# Patient Record
Sex: FEMALE | Race: WHITE | NOT HISPANIC OR LATINO | URBAN - METROPOLITAN AREA
[De-identification: names, ages, dates, MRNs, and addresses within clinical notes are randomized per-mention and may not be internally consistent; named-entity substitution may affect disease eponyms.]

---

## 2017-08-22 ENCOUNTER — OFFICE (OUTPATIENT)
Dept: URBAN - METROPOLITAN AREA CLINIC 14 | Facility: CLINIC | Age: 67
End: 2017-08-22

## 2017-08-22 VITALS
HEART RATE: 55 BPM | HEIGHT: 64 IN | DIASTOLIC BLOOD PRESSURE: 80 MMHG | RESPIRATION RATE: 16 BRPM | WEIGHT: 147 LBS | SYSTOLIC BLOOD PRESSURE: 147 MMHG

## 2017-08-22 DIAGNOSIS — K21.9 GASTRO-ESOPHAGEAL REFLUX DISEASE WITHOUT ESOPHAGITIS: ICD-10-CM

## 2017-08-22 PROCEDURE — 99212 OFFICE O/P EST SF 10 MIN: CPT | Performed by: INTERNAL MEDICINE

## 2017-08-22 RX ORDER — OMEPRAZOLE 20 MG/1
CAPSULE, DELAYED RELEASE ORAL
Qty: 0 | Refills: 0 | Status: ACTIVE

## 2017-08-22 RX ORDER — RANITIDINE 300 MG/1
TABLET ORAL
Qty: 90 | Refills: 4 | Status: COMPLETED
Start: 2017-08-22 | End: 2020-09-28

## 2018-09-04 ENCOUNTER — OFFICE (OUTPATIENT)
Dept: URBAN - METROPOLITAN AREA CLINIC 14 | Facility: CLINIC | Age: 68
End: 2018-09-04

## 2018-09-04 VITALS
DIASTOLIC BLOOD PRESSURE: 73 MMHG | HEIGHT: 64 IN | SYSTOLIC BLOOD PRESSURE: 144 MMHG | HEART RATE: 57 BPM | WEIGHT: 146 LBS

## 2018-09-04 DIAGNOSIS — R10.13 EPIGASTRIC PAIN: ICD-10-CM

## 2018-09-04 DIAGNOSIS — K21.9 GASTRO-ESOPHAGEAL REFLUX DISEASE WITHOUT ESOPHAGITIS: ICD-10-CM

## 2018-09-04 PROCEDURE — 99212 OFFICE O/P EST SF 10 MIN: CPT | Performed by: INTERNAL MEDICINE

## 2018-09-04 RX ORDER — RANITIDINE 300 MG/1
TABLET ORAL
Qty: 90 | Refills: 4 | Status: COMPLETED
Start: 2017-08-22 | End: 2020-09-28

## 2020-09-28 ENCOUNTER — OFFICE (OUTPATIENT)
Dept: URBAN - METROPOLITAN AREA CLINIC 19 | Facility: CLINIC | Age: 70
End: 2020-09-28

## 2020-09-28 VITALS
SYSTOLIC BLOOD PRESSURE: 171 MMHG | DIASTOLIC BLOOD PRESSURE: 78 MMHG | HEART RATE: 56 BPM | WEIGHT: 148 LBS | OXYGEN SATURATION: 98 % | HEIGHT: 64 IN

## 2020-09-28 DIAGNOSIS — R11.2 NAUSEA WITH VOMITING, UNSPECIFIED: ICD-10-CM

## 2020-09-28 DIAGNOSIS — K21.9 GASTRO-ESOPHAGEAL REFLUX DISEASE WITHOUT ESOPHAGITIS: ICD-10-CM

## 2020-09-28 DIAGNOSIS — R10.13 EPIGASTRIC PAIN: ICD-10-CM

## 2020-09-28 DIAGNOSIS — R10.11 RIGHT UPPER QUADRANT PAIN: ICD-10-CM

## 2020-09-28 DIAGNOSIS — K80.50 CALCULUS OF BILE DUCT WITHOUT CHOLANGITIS OR CHOLECYSTITIS W: ICD-10-CM

## 2020-09-28 LAB
AMYLASE: 40 U/L (ref 31–110)
CBC, PLATELET, NO DIFFERENTIAL: HEMATOCRIT: 44.9 % (ref 34–46.6)
CBC, PLATELET, NO DIFFERENTIAL: HEMOGLOBIN: 14.8 G/DL (ref 11.1–15.9)
CBC, PLATELET, NO DIFFERENTIAL: MCH: 30.5 PG (ref 26.6–33)
CBC, PLATELET, NO DIFFERENTIAL: MCHC: 33 G/DL (ref 31.5–35.7)
CBC, PLATELET, NO DIFFERENTIAL: MCV: 92 FL (ref 79–97)
CBC, PLATELET, NO DIFFERENTIAL: PLATELETS: 444 X10E3/UL (ref 150–450)
CBC, PLATELET, NO DIFFERENTIAL: RBC: 4.86 X10E6/UL (ref 3.77–5.28)
CBC, PLATELET, NO DIFFERENTIAL: RDW: 13.4 % (ref 11.7–15.4)
CBC, PLATELET, NO DIFFERENTIAL: WBC: 6.7 X10E3/UL (ref 3.4–10.8)
COMP. METABOLIC PANEL (14): A/G RATIO: 1.7 (ref 1.2–2.2)
COMP. METABOLIC PANEL (14): ALBUMIN: 4.5 G/DL (ref 3.7–4.7)
COMP. METABOLIC PANEL (14): ALKALINE PHOSPHATASE: 90 IU/L (ref 39–117)
COMP. METABOLIC PANEL (14): ALT (SGPT): 55 IU/L — HIGH (ref 0–32)
COMP. METABOLIC PANEL (14): AST (SGOT): 33 IU/L (ref 0–40)
COMP. METABOLIC PANEL (14): BILIRUBIN, TOTAL: 0.4 MG/DL (ref 0–1.2)
COMP. METABOLIC PANEL (14): BUN/CREATININE RATIO: 20 (ref 12–28)
COMP. METABOLIC PANEL (14): BUN: 24 MG/DL (ref 8–27)
COMP. METABOLIC PANEL (14): CALCIUM: 10.5 MG/DL — HIGH (ref 8.7–10.3)
COMP. METABOLIC PANEL (14): CARBON DIOXIDE, TOTAL: 23 MMOL/L (ref 20–29)
COMP. METABOLIC PANEL (14): CHLORIDE: 103 MMOL/L (ref 96–106)
COMP. METABOLIC PANEL (14): CREATININE: 1.2 MG/DL — HIGH (ref 0.57–1)
COMP. METABOLIC PANEL (14): EGFR IF AFRICN AM: 52 ML/MIN/1.73 — LOW (ref 59–?)
COMP. METABOLIC PANEL (14): EGFR IF NONAFRICN AM: 45 ML/MIN/1.73 — LOW (ref 59–?)
COMP. METABOLIC PANEL (14): GLOBULIN, TOTAL: 2.7 G/DL (ref 1.5–4.5)
COMP. METABOLIC PANEL (14): GLUCOSE: 96 MG/DL (ref 65–99)
COMP. METABOLIC PANEL (14): POTASSIUM: 4.8 MMOL/L (ref 3.5–5.2)
COMP. METABOLIC PANEL (14): PROTEIN, TOTAL: 7.2 G/DL (ref 6–8.5)
COMP. METABOLIC PANEL (14): SODIUM: 141 MMOL/L (ref 134–144)
LIPASE: 42 U/L (ref 14–85)

## 2020-09-28 PROCEDURE — 99214 OFFICE O/P EST MOD 30 MIN: CPT

## 2020-09-28 RX ORDER — HYDROCODONE BITARTRATE AND ACETAMINOPHEN 5; 325 MG/1; MG/1
TABLET ORAL
Qty: 18 | Refills: 0 | Status: COMPLETED
Start: 2020-09-28 | End: 2020-12-28

## 2020-09-28 RX ORDER — ONDANSETRON HYDROCHLORIDE 4 MG/1
16 TABLET, FILM COATED ORAL
Qty: 24 | Refills: 2 | Status: ACTIVE
Start: 2020-09-28

## 2020-09-28 RX ORDER — FAMOTIDINE 20 MG/1
TABLET, FILM COATED ORAL
Qty: 0 | Refills: 0 | Status: ACTIVE

## 2020-11-24 ENCOUNTER — ON CAMPUS - OUTPATIENT (OUTPATIENT)
Dept: URBAN - METROPOLITAN AREA HOSPITAL 97 | Facility: HOSPITAL | Age: 70
End: 2020-11-24

## 2020-11-24 DIAGNOSIS — K80.50 CALCULUS OF BILE DUCT WITHOUT CHOLANGITIS OR CHOLECYSTITIS W: ICD-10-CM

## 2020-11-24 PROCEDURE — 43264 ERCP REMOVE DUCT CALCULI: CPT | Performed by: INTERNAL MEDICINE

## 2020-12-28 ENCOUNTER — OFFICE (OUTPATIENT)
Dept: URBAN - METROPOLITAN AREA CLINIC 19 | Facility: CLINIC | Age: 70
End: 2020-12-28

## 2020-12-28 VITALS
HEART RATE: 62 BPM | OXYGEN SATURATION: 99 % | SYSTOLIC BLOOD PRESSURE: 185 MMHG | HEIGHT: 64 IN | DIASTOLIC BLOOD PRESSURE: 81 MMHG | WEIGHT: 150 LBS

## 2020-12-28 DIAGNOSIS — K21.9 GASTRO-ESOPHAGEAL REFLUX DISEASE WITHOUT ESOPHAGITIS: ICD-10-CM

## 2020-12-28 DIAGNOSIS — R14.1 GAS PAIN: ICD-10-CM

## 2020-12-28 DIAGNOSIS — K80.50 CALCULUS OF BILE DUCT WITHOUT CHOLANGITIS OR CHOLECYSTITIS W: ICD-10-CM

## 2020-12-28 DIAGNOSIS — R94.5 ABNORMAL RESULTS OF LIVER FUNCTION STUDIES: ICD-10-CM

## 2020-12-28 LAB
HEPATIC FUNCTION PANEL (7): ALBUMIN: 4.6 G/DL (ref 3.7–4.7)
HEPATIC FUNCTION PANEL (7): ALKALINE PHOSPHATASE: 76 IU/L (ref 39–117)
HEPATIC FUNCTION PANEL (7): ALT (SGPT): 26 IU/L (ref 0–32)
HEPATIC FUNCTION PANEL (7): AST (SGOT): 25 IU/L (ref 0–40)
HEPATIC FUNCTION PANEL (7): BILIRUBIN, DIRECT: 0.16 MG/DL (ref 0–0.4)
HEPATIC FUNCTION PANEL (7): BILIRUBIN, TOTAL: 0.4 MG/DL (ref 0–1.2)
HEPATIC FUNCTION PANEL (7): PROTEIN, TOTAL: 6.8 G/DL (ref 6–8.5)

## 2020-12-28 PROCEDURE — 99214 OFFICE O/P EST MOD 30 MIN: CPT

## 2021-03-29 ENCOUNTER — OFFICE (OUTPATIENT)
Dept: URBAN - METROPOLITAN AREA CLINIC 19 | Facility: CLINIC | Age: 71
End: 2021-03-29

## 2021-03-29 VITALS
SYSTOLIC BLOOD PRESSURE: 159 MMHG | DIASTOLIC BLOOD PRESSURE: 64 MMHG | WEIGHT: 154 LBS | HEIGHT: 64 IN | HEART RATE: 62 BPM | OXYGEN SATURATION: 98 %

## 2021-03-29 DIAGNOSIS — K58.9 IRRITABLE BOWEL SYNDROME WITHOUT DIARRHEA: ICD-10-CM

## 2021-03-29 DIAGNOSIS — K21.9 GASTRO-ESOPHAGEAL REFLUX DISEASE WITHOUT ESOPHAGITIS: ICD-10-CM

## 2021-03-29 DIAGNOSIS — R14.1 GAS PAIN: ICD-10-CM

## 2021-03-29 PROCEDURE — 99214 OFFICE O/P EST MOD 30 MIN: CPT

## 2021-03-29 RX ORDER — FAMOTIDINE 20 MG/1
TABLET, FILM COATED ORAL
Qty: 0 | Refills: 0 | Status: ACTIVE

## 2021-03-29 RX ORDER — DICYCLOMINE HYDROCHLORIDE 20 MG/1
TABLET ORAL
Qty: 90 | Refills: 3 | Status: ACTIVE
Start: 2021-03-29

## 2021-03-29 RX ORDER — SODIUM PICOSULFATE, MAGNESIUM OXIDE, AND ANHYDROUS CITRIC ACID 10; 3.5; 12 MG/160ML; G/160ML; G/160ML
LIQUID ORAL
Qty: 1 | Refills: 0 | Status: COMPLETED
Start: 2021-03-29 | End: 2021-04-29

## 2021-04-29 ENCOUNTER — OFFICE (OUTPATIENT)
Dept: URBAN - METROPOLITAN AREA PATHOLOGY 22 | Facility: PATHOLOGY | Age: 71
End: 2021-04-29

## 2021-04-29 ENCOUNTER — AMBULATORY SURGICAL CENTER (OUTPATIENT)
Dept: URBAN - METROPOLITAN AREA SURGERY 2 | Facility: SURGERY | Age: 71
End: 2021-04-29

## 2021-04-29 ENCOUNTER — AMBULATORY SURGICAL CENTER (OUTPATIENT)
Dept: URBAN - METROPOLITAN AREA SURGERY 2 | Facility: SURGERY | Age: 71
End: 2021-04-29
Payer: MEDICARE

## 2021-04-29 VITALS
HEART RATE: 62 BPM | DIASTOLIC BLOOD PRESSURE: 78 MMHG | DIASTOLIC BLOOD PRESSURE: 78 MMHG | DIASTOLIC BLOOD PRESSURE: 51 MMHG | WEIGHT: 152 LBS | SYSTOLIC BLOOD PRESSURE: 157 MMHG | SYSTOLIC BLOOD PRESSURE: 120 MMHG | OXYGEN SATURATION: 95 % | HEART RATE: 54 BPM | OXYGEN SATURATION: 95 % | RESPIRATION RATE: 18 BRPM | RESPIRATION RATE: 16 BRPM | SYSTOLIC BLOOD PRESSURE: 114 MMHG | DIASTOLIC BLOOD PRESSURE: 44 MMHG | TEMPERATURE: 97.3 F | RESPIRATION RATE: 18 BRPM | OXYGEN SATURATION: 94 % | TEMPERATURE: 97.3 F | DIASTOLIC BLOOD PRESSURE: 54 MMHG | RESPIRATION RATE: 16 BRPM | HEART RATE: 53 BPM | DIASTOLIC BLOOD PRESSURE: 78 MMHG | DIASTOLIC BLOOD PRESSURE: 55 MMHG | DIASTOLIC BLOOD PRESSURE: 51 MMHG | HEART RATE: 62 BPM | OXYGEN SATURATION: 95 % | HEIGHT: 64 IN | DIASTOLIC BLOOD PRESSURE: 44 MMHG | HEIGHT: 64 IN | OXYGEN SATURATION: 96 % | SYSTOLIC BLOOD PRESSURE: 157 MMHG | OXYGEN SATURATION: 92 % | HEART RATE: 61 BPM | SYSTOLIC BLOOD PRESSURE: 112 MMHG | RESPIRATION RATE: 18 BRPM | DIASTOLIC BLOOD PRESSURE: 55 MMHG | SYSTOLIC BLOOD PRESSURE: 114 MMHG | TEMPERATURE: 98.2 F | TEMPERATURE: 97.3 F | OXYGEN SATURATION: 96 % | SYSTOLIC BLOOD PRESSURE: 112 MMHG | HEART RATE: 54 BPM | TEMPERATURE: 98.2 F | HEART RATE: 54 BPM | HEART RATE: 53 BPM | OXYGEN SATURATION: 94 % | TEMPERATURE: 98.2 F | WEIGHT: 152 LBS | DIASTOLIC BLOOD PRESSURE: 55 MMHG | SYSTOLIC BLOOD PRESSURE: 157 MMHG | HEART RATE: 61 BPM | SYSTOLIC BLOOD PRESSURE: 114 MMHG | HEART RATE: 62 BPM | RESPIRATION RATE: 16 BRPM | WEIGHT: 152 LBS | OXYGEN SATURATION: 92 % | SYSTOLIC BLOOD PRESSURE: 120 MMHG | SYSTOLIC BLOOD PRESSURE: 112 MMHG | OXYGEN SATURATION: 92 % | HEART RATE: 53 BPM | DIASTOLIC BLOOD PRESSURE: 44 MMHG | SYSTOLIC BLOOD PRESSURE: 120 MMHG | DIASTOLIC BLOOD PRESSURE: 54 MMHG | OXYGEN SATURATION: 94 % | SYSTOLIC BLOOD PRESSURE: 119 MMHG | DIASTOLIC BLOOD PRESSURE: 54 MMHG | OXYGEN SATURATION: 96 % | HEIGHT: 64 IN | DIASTOLIC BLOOD PRESSURE: 51 MMHG | HEART RATE: 61 BPM | SYSTOLIC BLOOD PRESSURE: 119 MMHG | SYSTOLIC BLOOD PRESSURE: 119 MMHG

## 2021-04-29 DIAGNOSIS — D12.2 BENIGN NEOPLASM OF ASCENDING COLON: ICD-10-CM

## 2021-04-29 DIAGNOSIS — Z12.11 ENCOUNTER FOR SCREENING FOR MALIGNANT NEOPLASM OF COLON: ICD-10-CM

## 2021-04-29 PROBLEM — K63.5 POLYP OF COLON: Status: ACTIVE | Noted: 2021-04-29

## 2021-04-29 PROCEDURE — 88305 TISSUE EXAM BY PATHOLOGIST: CPT | Performed by: INTERNAL MEDICINE

## 2021-04-29 PROCEDURE — 45380 COLONOSCOPY AND BIOPSY: CPT | Mod: PT | Performed by: INTERNAL MEDICINE

## 2021-04-29 PROCEDURE — G8918 PT W/O PREOP ORDER IV AB PRO: HCPCS | Performed by: INTERNAL MEDICINE

## 2021-04-29 PROCEDURE — G8907 PT DOC NO EVENTS ON DISCHARG: HCPCS | Performed by: INTERNAL MEDICINE

## 2021-06-21 ENCOUNTER — OFFICE (OUTPATIENT)
Dept: URBAN - METROPOLITAN AREA CLINIC 19 | Facility: CLINIC | Age: 71
End: 2021-06-21

## 2021-06-21 VITALS
DIASTOLIC BLOOD PRESSURE: 71 MMHG | OXYGEN SATURATION: 97 % | WEIGHT: 156 LBS | HEART RATE: 64 BPM | HEIGHT: 64 IN | SYSTOLIC BLOOD PRESSURE: 140 MMHG

## 2021-06-21 DIAGNOSIS — K21.9 GASTRO-ESOPHAGEAL REFLUX DISEASE WITHOUT ESOPHAGITIS: ICD-10-CM

## 2021-06-21 DIAGNOSIS — R14.1 GAS PAIN: ICD-10-CM

## 2021-06-21 DIAGNOSIS — K59.00 CONSTIPATION, UNSPECIFIED: ICD-10-CM

## 2021-06-21 PROCEDURE — 99213 OFFICE O/P EST LOW 20 MIN: CPT

## 2021-06-21 RX ORDER — OMEPRAZOLE 20 MG/1
CAPSULE, DELAYED RELEASE ORAL
Qty: 0 | Refills: 0 | Status: ACTIVE

## 2021-06-21 NOTE — SERVICEHPINOTES
Ms. Velasco is a 70-year-old female here for follow-up for bloating, chronic GERD, and constipation. Overall, the patient has been doing well. The patient states that she will take dicyclomine as needed for IBS type abdominal pain. Her constipation has been well controlled with increased water intake. It the patient reports that she has continued bloating. She states that she has had some disc the patient's symptoms by taking a daily probiotic. The patient did do a sucrose breath test which was negative. The patient states that her bloating improves by morning, but she will often have noted increased abdominal girth by the evening. The patient denies any current abdominal pain, or unintentional weight loss. The patient recently had colonoscopy and has a 7 year recall.